# Patient Record
Sex: MALE | Race: WHITE | NOT HISPANIC OR LATINO
[De-identification: names, ages, dates, MRNs, and addresses within clinical notes are randomized per-mention and may not be internally consistent; named-entity substitution may affect disease eponyms.]

---

## 2019-06-06 ENCOUNTER — RESULT REVIEW (OUTPATIENT)
Age: 84
End: 2019-06-06

## 2022-05-11 ENCOUNTER — APPOINTMENT (OUTPATIENT)
Dept: ORTHOPEDIC SURGERY | Facility: CLINIC | Age: 87
End: 2022-05-11
Payer: MEDICARE

## 2022-05-11 VITALS — BODY MASS INDEX: 23.55 KG/M2 | WEIGHT: 159 LBS | HEIGHT: 69 IN

## 2022-05-11 DIAGNOSIS — Z78.9 OTHER SPECIFIED HEALTH STATUS: ICD-10-CM

## 2022-05-11 DIAGNOSIS — Z86.39 PERSONAL HISTORY OF OTHER ENDOCRINE, NUTRITIONAL AND METABOLIC DISEASE: ICD-10-CM

## 2022-05-11 DIAGNOSIS — I10 ESSENTIAL (PRIMARY) HYPERTENSION: ICD-10-CM

## 2022-05-11 PROBLEM — Z00.00 ENCOUNTER FOR PREVENTIVE HEALTH EXAMINATION: Status: ACTIVE | Noted: 2022-05-11

## 2022-05-11 PROCEDURE — 99214 OFFICE O/P EST MOD 30 MIN: CPT | Mod: 25

## 2022-05-11 PROCEDURE — 71100 X-RAY EXAM RIBS UNI 2 VIEWS: CPT

## 2022-05-11 PROCEDURE — 20550 NJX 1 TENDON SHEATH/LIGAMENT: CPT

## 2022-05-11 RX ORDER — SIMVASTATIN 20 MG/1
20 TABLET, FILM COATED ORAL
Refills: 0 | Status: ACTIVE | COMMUNITY

## 2022-05-11 RX ORDER — METFORMIN HYDROCHLORIDE 1000 MG/1
1000 TABLET, COATED ORAL
Refills: 0 | Status: ACTIVE | COMMUNITY

## 2022-05-17 NOTE — PHYSICAL EXAM
[Left] : left rib [de-identified] : There is no obvious deformity to the left chest wall. \par There is mild ttp over the anterior ribs inferior to the areola.\par Excursion is normal with deep breathing and does not seem to produce pain.\par \par right small finger triggering with ttp over the A1 pulley.  [FreeTextEntry5] : no evidence of left rib fracture. Degenerative scoliotic curvature noted.

## 2022-05-17 NOTE — ASSESSMENT
[FreeTextEntry1] : Pt will f/u left rib pain with PCP for possible CT scan. he is aware that he needs to follow up with his pimary care physician.  \par Provided right small trigger finger CSI #2 today. \par RTO in 6 weeks.

## 2022-05-17 NOTE — HISTORY OF PRESENT ILLNESS
[8] : 8 [Sharp] : sharp [Shooting] : shooting [Constant] : constant [Nothing helps with pain getting better] : Nothing helps with pain getting better [de-identified] : \par 5/11/2022: Pt here for f/u of recurrent right small finger triggering. Mr. Echeverria also complains of chronic left rib pain s/p trip and fall this past September. Pt states he has never had xray of the left chest wall. \par  2/16/22: here to follow up pain in right MF and SF. Felt relief in MF but not SF. Now also complains\par of pain in left MF\par 12/22/21: c/o pain in the right MF and SF- got injection from Dr Gaming with no relief\par From Dr Gaming- RIGHT little and LEFT middle finger pain and\par \par PMH: DM, HTN, Hyperlipidemia.\par Allergies: NKDA.  [] : no [FreeTextEntry1] : rt hand [FreeTextEntry5] : pt reports pain in the pinky finger joint on the left side.  pt also reports pain in his left ribs relating to a fall on a sidewalk on September 6th 2021. [de-identified] : NUMC

## 2022-09-28 ENCOUNTER — APPOINTMENT (OUTPATIENT)
Dept: ORTHOPEDIC SURGERY | Facility: CLINIC | Age: 87
End: 2022-09-28
Payer: MEDICARE

## 2022-09-28 VITALS — BODY MASS INDEX: 23.55 KG/M2 | HEIGHT: 69 IN | WEIGHT: 159 LBS

## 2022-09-28 PROCEDURE — 64450 NJX AA&/STRD OTHER PN/BRANCH: CPT | Mod: RT

## 2022-09-28 PROCEDURE — 26055 INCISE FINGER TENDON SHEATH: CPT | Mod: 59,F7

## 2022-09-28 PROCEDURE — 99215 OFFICE O/P EST HI 40 MIN: CPT | Mod: 25

## 2022-09-28 PROCEDURE — 99215 OFFICE O/P EST HI 40 MIN: CPT | Mod: 57

## 2022-09-28 PROCEDURE — 20550 NJX 1 TENDON SHEATH/LIGAMENT: CPT | Mod: 50

## 2022-09-28 PROCEDURE — 20550 NJX 1 TENDON SHEATH/LIGAMENT: CPT | Mod: LT

## 2022-09-28 NOTE — IMAGING
[de-identified] : right small finger and middle finger triggering with ttp over the A1 pulleys. \par There is no acute skin changes or bony deformity to either upper extremity.\par left middle finger TTP a1 pulley\par +triggering\par otherwise farom symmetrically.\par NVID symmetrically.\par

## 2022-09-28 NOTE — ASSESSMENT
[FreeTextEntry1] : Pt provided right little finger and middle finger percutaneous release as well as left middle finger trigger finger CSI #2 today.\par \par We again reviewed the anatomy of the flexor sheath and pathology of trigger fingers with the use of drawings and discussion.  The patient has failed injections/nonoperative treatment.  We discussed the possibility of surgery including the use of a percutaneous versus an open trigger finger release.  We discussed that too many injections may lead to weakening of the tendon/tendon rupture and that surgery is indicated at this point.  The patient would like to proceed with a percutaneous procedure.  The patient understands the relative success rate of percutaneous versus open procedures.  They also understand that despite the minimally invasive nature, a percutaneous procedure is still surgery with its resultant scar tissue/possibility for stiffness.  Risks include bleeding, infection, injury to nerves, vessels, tendons, stiffness, pain, loss of range of motion, loss of function, CRPS.  We discussed the surgical plan and post procedure expectations.  We also discussed the possibility of prolonged pain/scar tissue and the possible need for therapy.  The patient is amenable to the risks, had the opportunity to ask questions, all questions were answered and the patient agreed.  After sterile prep, the region of the A1 pulley was injected with 3mL of plain lidocaine.  After several minutes, an 18 gauge needle was inserted through the anesthetized area and used to release the A1 pulley.  This was tolerated well and wound was dressed with a sterile band aid.\par \par We reviewed the anatomy of the flexor sheath and pathology of trigger fingers with the use of drawings and discussion.  We discussed the treatment options including splinting/nsaids, injection and surgery.  We discussed that too many injections may lead to weakening o the tendon/tendon rupture and the safety of two injections. After a discussion of the risks, benefits and alternatives along with the expectations, the patient was amenable to injection.  The indication for injection is pain and inflammation.  The skin overlying the tendon sheath/A1 pulley site was prepared with alcohol and ethyl chloride was sprayed topically.  Sterile technique was used. An injection of 1ml of lidocaine and 6mg of betamethasone was used.  The patient was instructed to call if redness, pain or fever occur.  They ay apply ice for 15 minutes eery hour for the next 12-24 hours as tolerated.  .  The patient understands that it may take 2-5 days to see a noticeable difference.  Sterile Band-Aid was applied.\par \par RTO IN 1 WEEK FOR F/U CARE. \par \par

## 2022-09-28 NOTE — PHYSICAL EXAM
[Left] : left rib [FreeTextEntry5] : no evidence of left rib fracture. Degenerative scoliotic curvature noted.

## 2022-09-28 NOTE — HISTORY OF PRESENT ILLNESS
[8] : 8 [Sharp] : sharp [Shooting] : shooting [Constant] : constant [Nothing helps with pain getting better] : Nothing helps with pain getting better [de-identified] : \par \par 6/28/2022: Pt here with left middle finger triggering and right middle and small finger triggering x 1.5 mos with no hx of trauma noted.\par Pt denies numbness/tingling. Pt has had 3 previous right middle finger CSIs and 2 previous CSIs to the right little finger.\par Mr. Echeverria believes he has had only one previous CSI to the left middle finger and he is offered a 2nd today. \par 5/11/2022: Pt here for f/u of recurrent right small finger triggering. Mr. Echeverria also complains of chronic left rib pain s/p trip and fall this past September. Pt states he has never had xray of the left chest wall. \par  2/16/22: here to follow up pain in right MF and SF. Felt relief in MF but not SF. Now also complains\par of pain in left MF\par 12/22/21: c/o pain in the right MF and SF- got injection from Dr Gaming with no relief\par From Dr Gaming- RIGHT little and LEFT middle finger pain and\par \par PMH: DM, HTN, Hyperlipidemia.\par Allergies: NKDA.  [] : no [FreeTextEntry1] : rt hand [FreeTextEntry5] : trigger finger [de-identified] : NUMC

## 2022-09-28 NOTE — REASON FOR VISIT
[FreeTextEntry2] : B/L hand trigger finger.. LT middle finger, RT Middle finger and RT Pinky
normal balance for safe upright activity in 2 weeks

## 2022-10-12 ENCOUNTER — APPOINTMENT (OUTPATIENT)
Dept: OTOLARYNGOLOGY | Facility: CLINIC | Age: 87
End: 2022-10-12

## 2022-10-12 ENCOUNTER — APPOINTMENT (OUTPATIENT)
Dept: ORTHOPEDIC SURGERY | Facility: CLINIC | Age: 87
End: 2022-10-12

## 2022-10-12 VITALS
HEART RATE: 73 BPM | DIASTOLIC BLOOD PRESSURE: 75 MMHG | HEIGHT: 69 IN | SYSTOLIC BLOOD PRESSURE: 178 MMHG | BODY MASS INDEX: 22.51 KG/M2 | WEIGHT: 152 LBS

## 2022-10-12 VITALS — WEIGHT: 152 LBS | HEIGHT: 69 IN | BODY MASS INDEX: 22.51 KG/M2

## 2022-10-12 DIAGNOSIS — H61.22 IMPACTED CERUMEN, LEFT EAR: ICD-10-CM

## 2022-10-12 DIAGNOSIS — J31.0 CHRONIC RHINITIS: ICD-10-CM

## 2022-10-12 DIAGNOSIS — R09.81 NASAL CONGESTION: ICD-10-CM

## 2022-10-12 DIAGNOSIS — R07.81 PLEURODYNIA: ICD-10-CM

## 2022-10-12 DIAGNOSIS — J34.2 DEVIATED NASAL SEPTUM: ICD-10-CM

## 2022-10-12 DIAGNOSIS — J32.9 CHRONIC SINUSITIS, UNSPECIFIED: ICD-10-CM

## 2022-10-12 PROCEDURE — 99203 OFFICE O/P NEW LOW 30 MIN: CPT | Mod: 25

## 2022-10-12 PROCEDURE — 20550 NJX 1 TENDON SHEATH/LIGAMENT: CPT | Mod: LT

## 2022-10-12 PROCEDURE — 31231 NASAL ENDOSCOPY DX: CPT

## 2022-10-12 PROCEDURE — 99214 OFFICE O/P EST MOD 30 MIN: CPT | Mod: 25

## 2022-10-12 PROCEDURE — 69210 REMOVE IMPACTED EAR WAX UNI: CPT

## 2022-10-12 RX ORDER — GLIPIZIDE 2.5 MG/1
TABLET ORAL
Refills: 0 | Status: ACTIVE | COMMUNITY

## 2022-10-12 RX ORDER — ASPIRIN 81 MG/1
81 TABLET, CHEWABLE ORAL
Refills: 0 | Status: ACTIVE | COMMUNITY
Start: 2022-10-12

## 2022-10-13 NOTE — PROCEDURE
[Recalcitrant Symptoms] : recalcitrant symptoms  [FreeTextEntry6] : Right Side:\par -clean and clear\par -clear to nasopharynx which looks normal\par -ethmoids wide open\par Left Side:\par -maxillary wide open\par -ethmoids wide open clean and clear\par -no polyps or growths

## 2022-10-13 NOTE — ASSESSMENT
[FreeTextEntry1] : Reviewed and reconciled medications, allergies, PMHx, PSHx, SocHx, FMHx \par \par Patient with h/o polyps in the nose, surgery in June 2019(middle turbs, sylvia bullosa, bilateral maxillary, left frontal, bilateral sphenoids, polyps), and Propels taken out. Patient was suppose to come back 2 months later but never did because of Covid. Patient presents stating he is having a little trouble breathing from the nose. He denies having asthma. \par \par Physical Exam:\par Left Ear: cerumen impaction removed via curettage\par -deviated septum left\par -mouth a little dry\par -tongue tie\par \par Flexible nasal Endoscopy:\par Right Side:\par -clean and clear\par -clear to nasopharynx which looks normal\par -ethmoids wide open\par Left Side:\par -maxillary wide open\par -ethmoids wide open clean and clear\par -no polyps or growths \par \par Plan: Left ear cerumen impaction removed via curettage. Flexible Nasal Endoscopy. Use Sinus Rinse. FU once a year.

## 2022-10-13 NOTE — CONSULT LETTER
[Dear  ___] : Dear  [unfilled], [Consult Letter:] : I had the pleasure of evaluating your patient, [unfilled]. [Please see my note below.] : Please see my note below. [Consult Closing:] : Thank you very much for allowing me to participate in the care of this patient.  If you have any questions, please do not hesitate to contact me. [Sincerely,] : Sincerely, [FreeTextEntry1] : Juan Gonzales MD FACS

## 2022-10-13 NOTE — HISTORY OF PRESENT ILLNESS
[de-identified] : Patient with h/o polyps in the nose, surgery in June 2019(middle turbs, sylvia bullosa, bilateral maxillary, left frontal, bilateral sphenoids, polyps), and Propels taken out. Patient was suppose to come back 2 months later but never did because of Covid. Patient presents stating he is having a little trouble breathing from the nose. He denies having asthma.

## 2022-10-13 NOTE — PHYSICAL EXAM
[Hearing Frausto Test (Tuning Fork On Forehead)] : no lateralization of tone [] : septum deviated to the left [Midline] : trachea located in midline position [Normal] : no rashes [FreeTextEntry9] : cerumen impaction removed via curettage  [de-identified] : mouth a little dry. Tongue tie [FreeTextEntry2] : sensations intact. sinuses nontender to percussion

## 2022-10-19 ENCOUNTER — APPOINTMENT (OUTPATIENT)
Dept: ORTHOPEDIC SURGERY | Facility: CLINIC | Age: 87
End: 2022-10-19

## 2022-10-19 VITALS — WEIGHT: 152 LBS | BODY MASS INDEX: 22.51 KG/M2 | HEIGHT: 69 IN

## 2022-10-19 DIAGNOSIS — M65.332 TRIGGER FINGER, LEFT MIDDLE FINGER: ICD-10-CM

## 2022-10-19 PROCEDURE — 99024 POSTOP FOLLOW-UP VISIT: CPT

## 2022-10-19 RX ORDER — DICLOFENAC SODIUM 1% 10 MG/G
1 GEL TOPICAL DAILY
Qty: 2 | Refills: 3 | Status: ACTIVE | COMMUNITY
Start: 2022-10-19 | End: 1900-01-01

## 2022-10-19 NOTE — HISTORY OF PRESENT ILLNESS
[8] : 8 [Sharp] : sharp [Shooting] : shooting [Constant] : constant [Nothing helps with pain getting better] : Nothing helps with pain getting better [de-identified] : 10/19/2022: Pt here s/p left middle finger percutaneous release. Triggering and pain has resolved.\par Pt also complains of right little finger pain s/p previous little and middle finger percutaneous release.\par There is no hx of trauma.\par Pt complains of pain to this region only with grasping/lifting. \par \par 10/12/2022: pt here s/p right little finger and middle finger percutaneous release. Mr. Echeverria states his fingers are no longer locking, however he still has mild discomfort to these regions.\par Pt also has left middle finger triggering despite 2 previous CSIs. \par \par 6/28/2022: Pt here with left middle finger triggering and right middle and small finger triggering x 1.5 mos with no hx of trauma noted.\par Pt denies numbness/tingling. Pt has had 3 previous right middle finger CSIs and 2 previous CSIs to the right little finger.\par Mr. Echeverria believes he has had only one previous CSI to the left middle finger and he is offered a 2nd today. \par 5/11/2022: Pt here for f/u of recurrent right small finger triggering. Mr. Echeverria also complains of chronic left rib pain s/p trip and fall this past September. Pt states he has never had xray of the left chest wall. \par  2/16/22: here to follow up pain in right MF and SF. Felt relief in MF but not SF. Now also complains\par of pain in left MF\par 12/22/21: c/o pain in the right MF and SF- got injection from Dr Gaming with no relief\par From Dr Gaming- RIGHT little and LEFT middle finger pain and\par \par PMH: DM, HTN, Hyperlipidemia.\par Allergies: NKDA.  [] : no [FreeTextEntry1] : rt hand [FreeTextEntry5] : trigger finger [de-identified] : NUMC

## 2022-10-19 NOTE — REASON FOR VISIT
[FreeTextEntry2] : f/u left middle trigger finger p. release  / right little and middle finger p. release.

## 2022-10-19 NOTE — HISTORY OF PRESENT ILLNESS
[8] : 8 [Sharp] : sharp [Shooting] : shooting [Constant] : constant [Nothing helps with pain getting better] : Nothing helps with pain getting better [de-identified] : 10/19/2022: Pt here s/p left middle finger percutaneous release. Triggering and pain has resolved.\par Pt also complains of right little finger pain s/p previous little and middle finger percutaneous release.\par There is no hx of trauma.\par Pt complains of pain to this region only with grasping/lifting. \par \par 10/12/2022: pt here s/p right little finger and middle finger percutaneous release. Mr. Echeverria states his fingers are no longer locking, however he still has mild discomfort to these regions.\par Pt also has left middle finger triggering despite 2 previous CSIs. \par \par 6/28/2022: Pt here with left middle finger triggering and right middle and small finger triggering x 1.5 mos with no hx of trauma noted.\par Pt denies numbness/tingling. Pt has had 3 previous right middle finger CSIs and 2 previous CSIs to the right little finger.\par Mr. Echeverria believes he has had only one previous CSI to the left middle finger and he is offered a 2nd today. \par 5/11/2022: Pt here for f/u of recurrent right small finger triggering. Mr. Echeverria also complains of chronic left rib pain s/p trip and fall this past September. Pt states he has never had xray of the left chest wall. \par  2/16/22: here to follow up pain in right MF and SF. Felt relief in MF but not SF. Now also complains\par of pain in left MF\par 12/22/21: c/o pain in the right MF and SF- got injection from Dr Gaming with no relief\par From Dr Gaming- RIGHT little and LEFT middle finger pain and\par \par PMH: DM, HTN, Hyperlipidemia.\par Allergies: NKDA.  [] : no [FreeTextEntry1] : rt hand [FreeTextEntry5] : trigger finger [de-identified] : NUMC

## 2022-10-19 NOTE — ASSESSMENT
[FreeTextEntry1] : Pt provided Voltaren gel qid to the right 5th MC region x 4 weeks.\par RTO in that time for f/u care.\par Will consider surgical intervention is pain has not resolved (however pt has noted pain relief since previous visit).\par

## 2022-10-19 NOTE — IMAGING
[de-identified] : Right hand with no triggering noted.\par Right hand ttp over the 5th A1 pulley noted with no triggering.\par ,intrinsic and pinch strength is 5/5.\par All digits are nvi with FAROM. \par \par Left hand with no ttp.\par There is no longer triggering of the middle finger.\par All digits are nvi with FAROM. \par ,intrinsic and pinch strength is 5/5.

## 2022-10-19 NOTE — IMAGING
[de-identified] : Right hand with no triggering noted.\par Right hand ttp over the 5th A1 pulley noted with no triggering.\par ,intrinsic and pinch strength is 5/5.\par All digits are nvi with FAROM. \par \par Left hand with no ttp.\par There is no longer triggering of the middle finger.\par All digits are nvi with FAROM. \par ,intrinsic and pinch strength is 5/5.

## 2022-11-09 ENCOUNTER — APPOINTMENT (OUTPATIENT)
Dept: ORTHOPEDIC SURGERY | Facility: CLINIC | Age: 87
End: 2022-11-09

## 2022-11-09 VITALS — WEIGHT: 152 LBS | HEIGHT: 69 IN | BODY MASS INDEX: 22.51 KG/M2

## 2022-11-09 DIAGNOSIS — M65.331 TRIGGER FINGER, RIGHT MIDDLE FINGER: ICD-10-CM

## 2022-11-09 DIAGNOSIS — M65.351 TRIGGER FINGER, RIGHT LITTLE FINGER: ICD-10-CM

## 2022-11-09 PROCEDURE — 99214 OFFICE O/P EST MOD 30 MIN: CPT | Mod: 57

## 2022-11-09 PROCEDURE — 20550 NJX 1 TENDON SHEATH/LIGAMENT: CPT | Mod: 59,RT

## 2022-11-09 NOTE — ASSESSMENT
[FreeTextEntry1] : We again reviewed the anatomy of the flexor sheath and pathology of trigger fingers with the use of drawings and discussion.  The patient has failed injections/nonoperative treatment.  We discussed the possibility of surgery including the use of a percutaneous versus an open trigger finger release.  We discussed that too many injections may lead to weakening of the tendon/tendon rupture and that surgery is indicated at this point.  The patient would like to proceed with a percutaneous procedure.  The patient understands the relative success rate of percutaneous versus open procedures.  They also understand that despite the minimally invasive nature, a percutaneous procedure is still surgery with its resultant scar tissue/possibility for stiffness.  Risks include bleeding, infection, injury to nerves, vessels, tendons, stiffness, pain, loss of range of motion, loss of function, CRPS.  We discussed the surgical plan and post procedure expectations.  We also discussed the possibility of prolonged pain/scar tissue and the possible need for therapy.  The patient is amenable to the risks, had the opportunity to ask questions, all questions were answered and the patient agreed.  After sterile prep, the region of the A1 pulley was injected with 3mL of plain lidocaine.  After several minutes, an 18 gauge needle was inserted through the anesthetized area and used to release the A1 pulley.  This was tolerated well and wound was dressed with a sterile band aid.\par \par Perc release was attempted on right middle and little finger today and was unsuccessful.  Pt will undergo open TFR in OR.\par \par We discussed the recommendation for carpal tunnel surgery- We reviewed that the goal of surgery is to prevent worsening and give the nerve a chance to recover. If symptoms are constant there is a chance that the nerve is already too badly damaged and no longer has the potential to recover.Risks, benefits, and alternatives of surgery discussed with patient (and family).Risks including but not limited to infection, blood loss, tendon damage, muscle damage, nerve damage, stiffness, pain, no resolution of symptoms, CRPS, loss of function, potential for secondary surgery, loss of limb or life.Patient understands and was allowed to voice questions or concerns.They agree to surgery\par

## 2022-11-09 NOTE — IMAGING
[de-identified] : Right hand with no triggering noted.\par Right hand ttp over the 5th A1 pulley noted with no triggering.\par ,intrinsic and pinch strength is 5/5.\par All digits are nvi with FAROM. \par \par Left hand with no ttp.\par There is no longer triggering of the middle finger.\par All digits are nvi with FAROM. \par ,intrinsic and pinch strength is 5/5.

## 2022-11-09 NOTE — HISTORY OF PRESENT ILLNESS
[8] : 8 [Sharp] : sharp [Shooting] : shooting [Constant] : constant [Nothing helps with pain getting better] : Nothing helps with pain getting better [de-identified] : 11/9/22:  Pt has recurrent trigger in right MF and LF\par \par 10/19/2022: Pt here s/p left middle finger percutaneous release. Triggering and pain has resolved.\par Pt also complains of right little finger pain s/p previous little and middle finger percutaneous release.\par There is no hx of trauma.\par Pt complains of pain to this region only with grasping/lifting. \par \par 10/12/2022: pt here s/p right little finger and middle finger percutaneous release. Mr. Echeverria states his fingers are no longer locking, however he still has mild discomfort to these regions.\par Pt also has left middle finger triggering despite 2 previous CSIs. \par \par 6/28/2022: Pt here with left middle finger triggering and right middle and small finger triggering x 1.5 mos with no hx of trauma noted.\par Pt denies numbness/tingling. Pt has had 3 previous right middle finger CSIs and 2 previous CSIs to the right little finger.\par Mr. Echeverria believes he has had only one previous CSI to the left middle finger and he is offered a 2nd today. \par 5/11/2022: Pt here for f/u of recurrent right small finger triggering. Mr. Echeverria also complains of chronic left rib pain s/p trip and fall this past September. Pt states he has never had xray of the left chest wall. \par  2/16/22: here to follow up pain in right MF and SF. Felt relief in MF but not SF. Now also complains\par of pain in left MF\par 12/22/21: c/o pain in the right MF and SF- got injection from Dr Gaming with no relief\par From Dr Gaming- RIGHT little and LEFT middle finger pain and\par \par PMH: DM, HTN, Hyperlipidemia.\par Allergies: NKDA.  [] : no [FreeTextEntry1] : rt hand [FreeTextEntry5] : trigger finger [de-identified] : NUMC

## 2022-11-23 NOTE — IMAGING
[de-identified] : right small finger and middle finger triggering with mild ttp over the A1 pulleys. There is no triggering noted.\par There is no acute skin changes or bony deformity to either upper extremity.\par left middle finger TTP a1 pulley with + triggering. \par otherwise farom symmetrically.\par NVID symmetrically.\par

## 2022-11-23 NOTE — ASSESSMENT
[FreeTextEntry1] : Pt provided left middle finger percutaneous release today.\par \par We again reviewed the anatomy of the flexor sheath and pathology of trigger fingers with the use of drawings and discussion.  The patient has failed injections/nonoperative treatment.  We discussed the possibility of surgery including the use of a percutaneous versus an open trigger finger release.  We discussed that too many injections may lead to weakening of the tendon/tendon rupture and that surgery is indicated at this point.  The patient would like to proceed with a percutaneous procedure.  The patient understands the relative success rate of percutaneous versus open procedures.  They also understand that despite the minimally invasive nature, a percutaneous procedure is still surgery with its resultant scar tissue/possibility for stiffness.  Risks include bleeding, infection, injury to nerves, vessels, tendons, stiffness, pain, loss of range of motion, loss of function, CRPS.  We discussed the surgical plan and post procedure expectations.  We also discussed the possibility of prolonged pain/scar tissue and the possible need for therapy.  The patient is amenable to the risks, had the opportunity to ask questions, all questions were answered and the patient agreed.  After sterile prep, the region of the A1 pulley was injected with 3mL of plain lidocaine.  After several minutes, an 18 gauge needle was inserted through the anesthetized area and used to release the A1 pulley.  This was tolerated well and wound was dressed with a sterile band aid.\par \par RTO IN 1 WEEK FOR F/U CARE. \par \par

## 2022-11-23 NOTE — HISTORY OF PRESENT ILLNESS
[8] : 8 [Sharp] : sharp [Shooting] : shooting [Constant] : constant [Nothing helps with pain getting better] : Nothing helps with pain getting better [de-identified] : 10/12/2022: pt here s/p right little finger and middle finger percutaneous release. Mr. Echeverria states his fingers are no longer locking, however he still has mild discomfort to these regions.\par Pt also has left middle finger triggering despite 2 previous CSIs. \par \par 6/28/2022: Pt here with left middle finger triggering and right middle and small finger triggering x 1.5 mos with no hx of trauma noted.\par Pt denies numbness/tingling. Pt has had 3 previous right middle finger CSIs and 2 previous CSIs to the right little finger.\par Mr. Echeverria believes he has had only one previous CSI to the left middle finger and he is offered a 2nd today. \par 5/11/2022: Pt here for f/u of recurrent right small finger triggering. Mr. Echeverria also complains of chronic left rib pain s/p trip and fall this past September. Pt states he has never had xray of the left chest wall. \par  2/16/22: here to follow up pain in right MF and SF. Felt relief in MF but not SF. Now also complains\par of pain in left MF\par 12/22/21: c/o pain in the right MF and SF- got injection from Dr Gaming with no relief\par From Dr Gaming- RIGHT little and LEFT middle finger pain and\par \par PMH: DM, HTN, Hyperlipidemia.\par Allergies: NKDA.  [] : no [FreeTextEntry1] : rt hand [FreeTextEntry5] : trigger finger [de-identified] : NUMC

## 2022-12-09 ENCOUNTER — APPOINTMENT (OUTPATIENT)
Age: 87
End: 2022-12-09

## 2022-12-21 ENCOUNTER — APPOINTMENT (OUTPATIENT)
Dept: ORTHOPEDIC SURGERY | Facility: CLINIC | Age: 87
End: 2022-12-21

## 2023-12-26 ENCOUNTER — APPOINTMENT (OUTPATIENT)
Dept: OTOLARYNGOLOGY | Facility: CLINIC | Age: 88
End: 2023-12-26

## 2025-02-05 ENCOUNTER — APPOINTMENT (OUTPATIENT)
Dept: ORTHOPEDIC SURGERY | Facility: CLINIC | Age: 89
End: 2025-02-05
Payer: MEDICARE

## 2025-02-05 VITALS — WEIGHT: 152 LBS | HEIGHT: 69 IN | BODY MASS INDEX: 22.51 KG/M2

## 2025-02-05 DIAGNOSIS — M25.512 PAIN IN LEFT SHOULDER: ICD-10-CM

## 2025-02-05 DIAGNOSIS — M54.50 LOW BACK PAIN, UNSPECIFIED: ICD-10-CM

## 2025-02-05 DIAGNOSIS — M65.342 TRIGGER FINGER, LEFT RING FINGER: ICD-10-CM

## 2025-02-05 DIAGNOSIS — M75.102 UNSPECIFIED ROTATOR CUFF TEAR OR RUPTURE OF LEFT SHOULDER, NOT SPECIFIED AS TRAUMATIC: ICD-10-CM

## 2025-02-05 DIAGNOSIS — M19.012 PRIMARY OSTEOARTHRITIS, LEFT SHOULDER: ICD-10-CM

## 2025-02-05 PROCEDURE — 99214 OFFICE O/P EST MOD 30 MIN: CPT | Mod: 57

## 2025-02-05 PROCEDURE — 26055 INCISE FINGER TENDON SHEATH: CPT | Mod: LT

## 2025-02-05 PROCEDURE — 73030 X-RAY EXAM OF SHOULDER: CPT | Mod: LT

## 2025-02-05 RX ORDER — MELOXICAM 7.5 MG/1
7.5 TABLET ORAL DAILY
Qty: 60 | Refills: 0 | Status: ACTIVE | COMMUNITY
Start: 2025-02-05 | End: 1900-01-01

## 2025-03-05 ENCOUNTER — APPOINTMENT (OUTPATIENT)
Dept: ORTHOPEDIC SURGERY | Facility: CLINIC | Age: 89
End: 2025-03-05